# Patient Record
Sex: FEMALE | Race: WHITE
[De-identification: names, ages, dates, MRNs, and addresses within clinical notes are randomized per-mention and may not be internally consistent; named-entity substitution may affect disease eponyms.]

---

## 2018-12-20 ENCOUNTER — HOSPITAL ENCOUNTER (EMERGENCY)
Dept: HOSPITAL 25 - UCCORT | Age: 33
Discharge: HOME | End: 2018-12-20
Payer: COMMERCIAL

## 2018-12-20 VITALS — DIASTOLIC BLOOD PRESSURE: 90 MMHG | SYSTOLIC BLOOD PRESSURE: 145 MMHG
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
## 2018-12-20 DIAGNOSIS — F17.210: ICD-10-CM

## 2018-12-20 DIAGNOSIS — S00.211A: Primary | ICD-10-CM

## 2018-12-20 PROCEDURE — 99212 OFFICE O/P EST SF 10 MIN: CPT

## 2018-12-20 PROCEDURE — 90471 IMMUNIZATION ADMIN: CPT

## 2018-12-20 PROCEDURE — 90715 TDAP VACCINE 7 YRS/> IM: CPT

## 2018-12-20 PROCEDURE — G0463 HOSPITAL OUTPT CLINIC VISIT: HCPCS

## 2018-12-20 NOTE — UC
Minor Trauma HPI





- HPI Summary


HPI Summary: 


33-year-old female presents for work-related injury to her right eye.  States 

she works at the [employer] in one of the clients became angry and saw her 

from behind scratching her face with his fingernails near the right eye. Denies 

eye injury, pain, redness, drainage, visual disturbances, or photophobia. 

Tetanus status unknown.








- History of Current Complaint


Chief Complaint: UCSkin


Stated Complaint: WC-ASSAULT


Time Seen by Provider: 12/20/18 10:53


Hx Obtained From: Patient


Hx Last Menstrual Period: unknown


Pain Intensity: 4


Body - Head: 


  __________________________














  __________________________





 1 - 3 superficial abrasions








- Allergies/Home Medications


Allergies/Adverse Reactions: 


 Allergies











Allergy/AdvReac Type Severity Reaction Status Date / Time


 


No Known Allergies Allergy   Verified 12/20/18 10:45














PMH/Surg Hx/FS Hx/Imm Hx


Previously Healthy: Yes - Denies significant PMH





- Surgical History


Surgical History: None





- Family History


Known Family History: Positive: Non-Contributory





- Social History


Occupation: Employed Full-time


Lives: With Family


Alcohol Use: None


Substance Use Type: None


Smoking Status (MU): Light Every Day Tobacco Smoker


Type: Cigarettes


Amount Used/How Often: 5-6 cigarettes daily





- Immunization History


Most Recent Influenza Vaccination: 10/2015


Most Recent Tetanus Shot: unknown





Review of Systems


All Other Systems Reviewed And Are Negative: Yes


Skin: Positive: Other - See HPI


Eyes: Negative: Blurred Vision, Diplopia, Drainage, Eye Redness, Photophobia


Is Patient Immunocompromised?: No





Physical Exam





- Summary


Physical Exam Summary: 


GENERAL APPEARANCE: Well developed, obese, alert and cooperative, and appears 

to be in no acute distress.





HEAD: Atraumatic. normocephalic.





EYES: PERRL, EOM intact. Vision is grossly intact. Conjuctiva clear. No 

drainage.





NECK: Neck supple, non-tender without lymphadenopathy.





CARDIAC: Normal S1 and S2. No S3, S4 or murmurs. Rhythm is regular. There is no 

peripheral edema, cyanosis or pallor. Extremities are warm and well perfused. 

Capillary refill is less than 2 seconds.





LUNGS: Clear to auscultation and percussion without rales, rhonchi, wheezing or 

diminished breath sounds.





ABDOMEN: Positive bowel sounds. Soft, nondistended, nontender. No guarding or 

rebound. No masses or hepatosplenomegally.





MUSKULOSKELETAL: ROM intact to all extremities. No joint erythema or 

tenderness. Normal muscular development. Normal gait.





EXTREMITIES: No significant deformity or joint abnormality. No edema. 

Peripheral pulses intact.





NEUROLOGICAL: Strength and sensation symmetric and intact throughout. 





SKIN: General skin exam normal. 3 small linear abrasions, 1 superficial 

abrasions to the right medial upper eyelid and 2 to the medial aspect of her 

lower right eyelid are noted.





Triage Information Reviewed: Yes


Vital Signs: 


 Initial Vital Signs











Temp  99.1 F   12/20/18 10:46


 


Pulse  82   12/20/18 10:46


 


Resp  16   12/20/18 10:46


 


BP  145/90   12/20/18 10:46


 


Pulse Ox  98   12/20/18 10:46











Vital Signs Reviewed: Yes





Minor Trauma Course/Dx





- Course


Course Of Treatment: 33-year-old female presents for work-related injury to her 

right eye.  States she works at the [employer] in one of the clients became 

angry and saw her from behind scratching her face with his fingernails near the 

right eye. Denies eye injury, pain, redness, drainage, visual disturbances, or 

photophobia. Tetanus status unknown. Exam reveals 3 small linear abrasions, 1 

superficial abrasions to the right medial upper eyelid and 2 to the medial 

aspect of her lower right eyelid. No other injuries noted. Wounds were cleansed 

and abtibiotic ointment applied. Tdap was given. She is to follow up with 

occupational medicine if needed. Wound care instructions and warning symptoms 

were reviewed. Verbalizes understanding and agrees with POC.





- Differential Dx/Diagnosis


Differential Diagnosis/HQI/PQRI: Abrasion(s), Contusion(s), Laceration(s)


Provider Diagnosis: 


 Superficial abrasion of eye region structure








Discharge





- Sign-Out/Discharge


Documenting (check all that apply): Patient Departure


All imaging exams completed and their final reports reviewed: No Studies





- Discharge Plan


Condition: Stable


Disposition: HOME


Patient Education Materials:  Abrasion (ED)


Forms:  *Work Release


Referrals: 


Brenda Anguiano PA [Primary Care Provider] - 


Derek Cotton MD [Medical Doctor] - If Needed


Additional Instructions: 


You have some superficial abrasions near the right eye that should heal well 

without any repair.





Gently clean the wounds at least twice a day with a mild soap and water. Apply 

a small amount of an antibiotic ointment such as Bacitracin twice a day to keep 

the wounds moist and help reduce the chance of scarring.





Your tetanus was updated today. Please let your primary care provider know so 

they can update your records.





Your blood pressure was elevated in the clinic today. It is recommended that 

you follow up with your primary care provider within 4 weeks to have this 

rechecked.





Follow up with Dr. Cotton, occupational medicine, if needed.





Watch for signs of infection including redness that spreads, swelling, pus 

draining from the wound, or fever greater than 100.5 F. Seek immediate medical 

attention if any of these should occur.





Seek immediate medical attention in the emergency room if you have severe eye 

pain, visual disturbance, or loss of vision.





- Billing Disposition and Condition


Condition: STABLE


Disposition: Home